# Patient Record
Sex: MALE | Race: OTHER | HISPANIC OR LATINO | URBAN - METROPOLITAN AREA
[De-identification: names, ages, dates, MRNs, and addresses within clinical notes are randomized per-mention and may not be internally consistent; named-entity substitution may affect disease eponyms.]

---

## 2020-08-17 ENCOUNTER — EMERGENCY (EMERGENCY)
Facility: HOSPITAL | Age: 25
LOS: 1 days | Discharge: HOME | End: 2020-08-17
Attending: STUDENT IN AN ORGANIZED HEALTH CARE EDUCATION/TRAINING PROGRAM
Payer: COMMERCIAL

## 2020-08-17 ENCOUNTER — EMERGENCY (EMERGENCY)
Facility: HOSPITAL | Age: 25
LOS: 1 days | End: 2020-08-17
Attending: EMERGENCY MEDICINE
Payer: COMMERCIAL

## 2020-08-17 VITALS
WEIGHT: 160.06 LBS | RESPIRATION RATE: 18 BRPM | HEART RATE: 95 BPM | DIASTOLIC BLOOD PRESSURE: 81 MMHG | SYSTOLIC BLOOD PRESSURE: 133 MMHG | OXYGEN SATURATION: 97 % | HEIGHT: 66 IN | TEMPERATURE: 98 F

## 2020-08-17 VITALS
HEART RATE: 90 BPM | DIASTOLIC BLOOD PRESSURE: 75 MMHG | RESPIRATION RATE: 17 BRPM | SYSTOLIC BLOOD PRESSURE: 122 MMHG | OXYGEN SATURATION: 98 %

## 2020-08-17 VITALS
HEART RATE: 64 BPM | SYSTOLIC BLOOD PRESSURE: 132 MMHG | OXYGEN SATURATION: 99 % | HEIGHT: 66 IN | TEMPERATURE: 98 F | DIASTOLIC BLOOD PRESSURE: 86 MMHG | RESPIRATION RATE: 20 BRPM

## 2020-08-17 LAB
ALBUMIN SERPL ELPH-MCNC: 4.1 G/DL — SIGNIFICANT CHANGE UP (ref 3.3–5.2)
ALP SERPL-CCNC: 70 U/L — SIGNIFICANT CHANGE UP (ref 40–120)
ALT FLD-CCNC: 33 U/L — SIGNIFICANT CHANGE UP
ANION GAP SERPL CALC-SCNC: 14 MMOL/L — SIGNIFICANT CHANGE UP (ref 5–17)
ANISOCYTOSIS BLD QL: SLIGHT — SIGNIFICANT CHANGE UP
AST SERPL-CCNC: 43 U/L — HIGH
BASOPHILS # BLD AUTO: 0.03 K/UL — SIGNIFICANT CHANGE UP (ref 0–0.2)
BASOPHILS NFR BLD AUTO: 1.7 % — SIGNIFICANT CHANGE UP (ref 0–2)
BILIRUB SERPL-MCNC: 0.5 MG/DL — SIGNIFICANT CHANGE UP (ref 0.4–2)
BUN SERPL-MCNC: 5 MG/DL — LOW (ref 8–20)
CALCIUM SERPL-MCNC: 8.1 MG/DL — LOW (ref 8.6–10.2)
CHLORIDE SERPL-SCNC: 100 MMOL/L — SIGNIFICANT CHANGE UP (ref 98–107)
CO2 SERPL-SCNC: 26 MMOL/L — SIGNIFICANT CHANGE UP (ref 22–29)
CREAT SERPL-MCNC: 0.62 MG/DL — SIGNIFICANT CHANGE UP (ref 0.5–1.3)
EOSINOPHIL # BLD AUTO: 0.02 K/UL — SIGNIFICANT CHANGE UP (ref 0–0.5)
EOSINOPHIL NFR BLD AUTO: 0.9 % — SIGNIFICANT CHANGE UP (ref 0–6)
GIANT PLATELETS BLD QL SMEAR: PRESENT — SIGNIFICANT CHANGE UP
GLUCOSE SERPL-MCNC: 112 MG/DL — HIGH (ref 70–99)
HCT VFR BLD CALC: 42.8 % — SIGNIFICANT CHANGE UP (ref 39–50)
HGB BLD-MCNC: 15.1 G/DL — SIGNIFICANT CHANGE UP (ref 13–17)
LIDOCAIN IGE QN: 34 U/L — SIGNIFICANT CHANGE UP (ref 22–51)
LYMPHOCYTES # BLD AUTO: 0.93 K/UL — LOW (ref 1–3.3)
LYMPHOCYTES # BLD AUTO: 47.8 % — HIGH (ref 13–44)
MANUAL SMEAR VERIFICATION: SIGNIFICANT CHANGE UP
MCHC RBC-ENTMCNC: 33.8 PG — SIGNIFICANT CHANGE UP (ref 27–34)
MCHC RBC-ENTMCNC: 35.3 GM/DL — SIGNIFICANT CHANGE UP (ref 32–36)
MCV RBC AUTO: 95.7 FL — SIGNIFICANT CHANGE UP (ref 80–100)
MICROCYTES BLD QL: SLIGHT — SIGNIFICANT CHANGE UP
MONOCYTES # BLD AUTO: 0.15 K/UL — SIGNIFICANT CHANGE UP (ref 0–0.9)
MONOCYTES NFR BLD AUTO: 7.8 % — SIGNIFICANT CHANGE UP (ref 2–14)
NEUTROPHILS # BLD AUTO: 0.7 K/UL — LOW (ref 1.8–7.4)
NEUTROPHILS NFR BLD AUTO: 35.7 % — LOW (ref 43–77)
OVALOCYTES BLD QL SMEAR: SLIGHT — SIGNIFICANT CHANGE UP
PLAT MORPH BLD: NORMAL — SIGNIFICANT CHANGE UP
PLATELET # BLD AUTO: 164 K/UL — SIGNIFICANT CHANGE UP (ref 150–400)
POLYCHROMASIA BLD QL SMEAR: SIGNIFICANT CHANGE UP
POTASSIUM SERPL-MCNC: 3.6 MMOL/L — SIGNIFICANT CHANGE UP (ref 3.5–5.3)
POTASSIUM SERPL-SCNC: 3.6 MMOL/L — SIGNIFICANT CHANGE UP (ref 3.5–5.3)
PROT SERPL-MCNC: 7 G/DL — SIGNIFICANT CHANGE UP (ref 6.6–8.7)
RBC # BLD: 4.47 M/UL — SIGNIFICANT CHANGE UP (ref 4.2–5.8)
RBC # FLD: 12.5 % — SIGNIFICANT CHANGE UP (ref 10.3–14.5)
RBC BLD AUTO: ABNORMAL
SODIUM SERPL-SCNC: 140 MMOL/L — SIGNIFICANT CHANGE UP (ref 135–145)
VARIANT LYMPHS # BLD: 6.1 % — HIGH (ref 0–6)
WBC # BLD: 1.95 K/UL — LOW (ref 3.8–10.5)
WBC # FLD AUTO: 1.95 K/UL — LOW (ref 3.8–10.5)

## 2020-08-17 PROCEDURE — 96375 TX/PRO/DX INJ NEW DRUG ADDON: CPT

## 2020-08-17 PROCEDURE — 80053 COMPREHEN METABOLIC PANEL: CPT

## 2020-08-17 PROCEDURE — 85027 COMPLETE CBC AUTOMATED: CPT

## 2020-08-17 PROCEDURE — 99284 EMERGENCY DEPT VISIT MOD MDM: CPT

## 2020-08-17 PROCEDURE — 99284 EMERGENCY DEPT VISIT MOD MDM: CPT | Mod: 25

## 2020-08-17 PROCEDURE — 36415 COLL VENOUS BLD VENIPUNCTURE: CPT

## 2020-08-17 PROCEDURE — 99283 EMERGENCY DEPT VISIT LOW MDM: CPT

## 2020-08-17 PROCEDURE — 96374 THER/PROPH/DIAG INJ IV PUSH: CPT

## 2020-08-17 PROCEDURE — 83690 ASSAY OF LIPASE: CPT

## 2020-08-17 RX ORDER — SUCRALFATE 1 G
1 TABLET ORAL
Qty: 120 | Refills: 0
Start: 2020-08-17 | End: 2020-09-15

## 2020-08-17 RX ORDER — ONDANSETRON 8 MG/1
1 TABLET, FILM COATED ORAL
Qty: 12 | Refills: 0
Start: 2020-08-17

## 2020-08-17 RX ORDER — FAMOTIDINE 10 MG/ML
20 INJECTION INTRAVENOUS ONCE
Refills: 0 | Status: COMPLETED | OUTPATIENT
Start: 2020-08-17 | End: 2020-08-17

## 2020-08-17 RX ORDER — SODIUM CHLORIDE 9 MG/ML
1000 INJECTION INTRAMUSCULAR; INTRAVENOUS; SUBCUTANEOUS ONCE
Refills: 0 | Status: COMPLETED | OUTPATIENT
Start: 2020-08-17 | End: 2020-08-17

## 2020-08-17 RX ORDER — ONDANSETRON 8 MG/1
4 TABLET, FILM COATED ORAL ONCE
Refills: 0 | Status: COMPLETED | OUTPATIENT
Start: 2020-08-17 | End: 2020-08-17

## 2020-08-17 RX ORDER — SUCRALFATE 1 G
1 TABLET ORAL ONCE
Refills: 0 | Status: COMPLETED | OUTPATIENT
Start: 2020-08-17 | End: 2020-08-17

## 2020-08-17 RX ADMIN — Medication 25 MILLIGRAM(S): at 13:43

## 2020-08-17 RX ADMIN — ONDANSETRON 4 MILLIGRAM(S): 8 TABLET, FILM COATED ORAL at 10:45

## 2020-08-17 RX ADMIN — FAMOTIDINE 20 MILLIGRAM(S): 10 INJECTION INTRAVENOUS at 13:43

## 2020-08-17 RX ADMIN — SODIUM CHLORIDE 1000 MILLILITER(S): 9 INJECTION INTRAMUSCULAR; INTRAVENOUS; SUBCUTANEOUS at 10:45

## 2020-08-17 RX ADMIN — FAMOTIDINE 20 MILLIGRAM(S): 10 INJECTION INTRAVENOUS at 10:46

## 2020-08-17 RX ADMIN — Medication 50 MILLIGRAM(S): at 10:46

## 2020-08-17 RX ADMIN — Medication 30 MILLILITER(S): at 10:46

## 2020-08-17 RX ADMIN — Medication 1 GRAM(S): at 13:43

## 2020-08-17 NOTE — ED STATDOCS - NSFOLLOWUPINSTRUCTIONS_ED_ALL_ED_FT
Gastritis - Adultos  Gastritis, Adult    La gastritis es la inflamación del estómago. Hay dos tipos de gastritis:    Gastritis aguda. Dipika tipo aparece de manera repentina.  Gastritis crónica. Dipika tipo dura mucho tiempo.    La gastritis se manifiesta cuando el revestimiento del estómago se debilita o se lesiona. Sin tratamiento, la gastritis puede causar sangrado y úlceras estomacales.    ¿Cuáles son las causas?  Esta afección puede ser causada por lo siguiente:    Fernanda infección.  Beber alcohol en exceso.  Ciertos medicamentos.  Hay demasiado ácido en el estómago.  Fernanda enfermedad del intestino o del estómago.  Estrés.    ¿Cuáles son los signos o síntomas?  Los síntomas de esta afección incluyen los siguientes:    Dolor o ardor en la parte superior del abdomen.  Náuseas.  Vómitos.  Sensación molesta de distensión después de comer.    En algunos casos no hay síntomas.    ¿Cómo se diagnostica?  Esta afección se puede diagnosticar mediante:    Fernanda descripción de coco síntomas.  Un examen físico.  Estudios. Estos pueden incluir los siguientes:  Análisis de jeffery.  Pruebas de materia fecal.  Un estudio en el que se introduce un instrumento mason y flexible con fernanda jihan y fernanda cámara en el extremo a través del esófago hasta el estómago (endoscopía ingrid).  Un estudio en el cual se elaine fernanda muestra de tejido para analizarlo (biopsia).    ¿Cómo se trata?  Esta afección se puede tratar con medicamentos. Si la afección se debe a fernanda infección bacteriana, pueden recetarle medicamentos antibióticos. Si se debe al exceso de ácido estomacal, se pueden administrar medicamentos denominados antagonistas H2, inhibidores de la bomba de protones o antiácidos. El tratamiento puede también incluir interrumpir el uso de ciertos medicamentos floridalma aspirina, ibuprofeno u otros antiinflamatorios no esteroideos (ALAINA).    Siga estas indicaciones en whitten casa:  [place] los medicamentos de venta irina y los recetados solamente floridalma se lo haya indicado el médico.  Si le recetaron un antibiótico, tómelo floridalma se lo haya indicado el médico. No deje de mian el antibiótico aunque comience a sentirse mejor.  Mona suficiente líquido para mantener la orina de color amarillo pálido.  Shae comidas pequeñas y frecuentes diana el día en lugar de comidas abundantes.  Evite los alimentos y las bebidas que intensifican los síntomas.     Comuníquese con un médico si:  Los síntomas empeoran.  Los síntomas regresan después del tratamiento.    Solicite ayuda de inmediato si:  Vomita jeffery de color govea brillante o fernanda sustancia similar a los granos de café.  La materia fecal es regis o de color govea oscuro.  No puede retener los líquidos.  El dolor abdominal empeora.  Tiene fiebre.  No mejora luego de 1 semana.    Resumen  La gastritis es la inflamación del estómago que se manifiesta cuando el revestimiento del estómago se debilita o se lesiona.  Esta afección se diagnostica mediante los antecedentes médicos, un examen físico o estudios.  Esta afección puede tratarse con medicamentos para tratar la infección o medicamentos para reducir la cantidad de ácido en el estómago.  Si la afección se debe al alcohol o a la irritación producida por medicamentos que está tomando, se le puede indicar que deje de mian alcohol o que deje de mian esos medicamentos.    Trastorno por consumo de bebidas alcohólicas  Alcohol Use Disorder    El trastorno por consumo de bebidas alcohólicas ocurre cuando el consumo de alcohol altera whitten chris cotidiana. Las personas que padecen esta afección beben demasiado alcohol y no pueden controlar el consumo.    Dipika trastorno puede causar problemas graves en la jose física. Puede afectar el cerebro, el corazón, el hígado, el páncreas, el sistema inmunitario, el estómago y los intestinos. El trastorno por consumo de bebidas alcohólicas puede aumentar whitten riesgo de contraer ciertos tipos de cáncer y causar problemas con la jose mental, tales floridalma depresión, ansiedad, psicosis, delirios y demencia. Las personas que tienen dipika trastorno corren el riesgo de lastimarse a ellas mismas o a otras personas.    ¿Cuáles son las causas?  Esta afección se debe al consumo excesivo de alcohol con el transcurso del tiempo. No es causado por consumir demasiado alcohol solo fernanda o dos veces. Algunas personas que sufren esta afección beben alcohol para enfrentarse a situaciones negativas de la chris o escapar de ellas. Otros beben para aliviar el dolor o los síntomas de fernanda enfermedad mental.    ¿Qué incrementa el riesgo?  Es más probable que desarrolle esta afección si:    Tiene antecedentes familiares de trastorno por consumo de bebidas alcohólicas.  Whitten cultura alienta el consumo de alcohol al punto de la intoxicación o facilita el acceso al alcohol.  Tuvo un trastorno emocional o de conducta en la infancia.  Fue víctima de abuso.  Es adolescente y:  Tiene calificaciones bajas o dificultades en la escuela.  Coco cuidadores no le hablan sobre negarse a mian alcohol ni supervisan coco actividades.  Es impulsivo o tiene problemas con el autocontrol.    ¿Cuáles son los signos o los síntomas?  Los síntomas de esta afección incluyen los siguientes:    Beber más de lo que desea.  Beber por más tiempo del que desea.  Intentar varias veces beber menos o controlar el consumo de alcohol.  Invertir mucho tiempo en conseguir alcohol, beber o recuperarse de lavell bebido.  Sentir fernanda necesidad imperiosa de beber alcohol.  Tener problemas en el trabajo, la escuela o el hogar debido al consumo de alcohol.  Tener problemas en las relaciones debido al consumo de alcohol.  Beber cuando es peligroso hacerlo, por ejemplo, antes de conducir.  Continuar bebiendo incluso sabiendo que podría tener un problema físico o mental relacionado con el consumo de alcohol.  Necesitar cada vez más alcohol para obtener el mismo efecto que desea de dipika (generar tolerancia).  Tener síntomas de abstinencia al dejar de beber. Entre los síntomas de abstinencia se incluyen los siguientes:  Fatiga.  Pesadillas.  Dificultad para dormir.  Depresión.  Ansiedad.  Fiebre.  Convulsiones.  Confusión grave.  Codie o sentir cosas que no existen (alucinaciones).  Temblores.  Frecuencia cardíaca acelerada.  Respiración rápida.  Hipertensión arterial.  Consumir para evitar los síntomas de abstinencia.    ¿Cómo se diagnostica?  Esta afección se diagnostica con fernanda evaluación. El médico puede comenzar la evaluación con dinora o cuatro preguntas sobre whitten consumo de alcohol.    El médico podrá realizar un examen físico o análisis de laboratorio para determinar si tiene problemas físicos floridalma resultado del consumo de alcohol. Puede derivarlo a un profesional de jose mental para que realice fernanda evaluación.    ¿Cómo se trata?  Algunas personas con trastorno por consumo de bebidas alcohólicas pueden reducir el consumo a niveles de bajo riesgo. Otras necesitan dejar el alcohol por completo. Cuando sea necesario, puede recibir ayuda de profesionales de jose mental capacitados en el tratamiento del abuso de sustancias. El médico puede ayudarlo a determinar la gravedad de whitten trastorno por consumo de bebidas alcohólicas y el tipo de tratamiento que necesita. Las formas de tratamiento disponibles son:    Desintoxicación. La desintoxicación implica dejar de beber y mian medicamentos recetados en el plazo de la primera semana para reducir los síntomas de la abstinencia. Dipika tratamiento es importante para las personas que ya fatima tenido síntomas de abstinencia y para los que consumen en exceso, quienes probablemente sufran síntomas de abstinencia. La abstinencia puede ser peligrosa y, en casos graves, puede causar la muerte. La desintoxicación puede realizarse en el hogar, en un ámbito extrahospitalario, en un centro de atención primaria, en un hospital o en un centro de tratamiento para abuso de sustancias.  Asesoramiento psicológico. Dipika tratamiento también se conoce floridalma psicoterapia. La realizan terapeutas especializados en el tratamiento de pacientes que abusan de sustancias. Un terapeuta puede abordar los motivos por los que consume alcohol y sugerirle maneras de evitar que vuelva a beber o que tenga un problema con la bebida. Los objetivos de la psicoterapia son los siguientes:  Encontrar actividades saludables y formas de afrontar el estrés.  Identificar y evitar aquello que lo conduzca a beber alcohol.  Aprender a controlar las ansias de beber.  Medicamentos. Los medicamentos pueden ayudar a tratar el trastorno por consumo de bebidas alcohólicas porque:  Controlan las ansias de beber.  Reducen el sentimiento positivo que experimenta al beber alcohol.  Causan fernanda reacción física desagradable cuando eduardo alcohol (terapia de aversión).  Grupos de apoyo. Los grupos de apoyo son dirigidos por personas que fatima superado whitten problema con la bebida. [place] apoyo emocional, consejos y orientación.    Estas formas de tratamiento, generalmente, se combinan. Algunas personas con esta afección se benefician del tratamiento combinado que se ofrece en algunos centros de tratamiento especializados en el abuso de sustancias.     Siga estas indicaciones en whitten casa:  [place] los medicamentos de venta irina y los recetados solamente floridalma se lo haya indicado el médico.  Consulte al médico antes de empezar cualquier medicamento nuevo.  Pida a familiares y amigos que no le ofrezcan alcohol.  Evite situaciones en las que se sirva alcohol, incluidas las reuniones en las que otros estén bebiendo alcohol.  Elabore un plan de acción si siente la tentación de beber alcohol.  Busque pasatiempos o actividades que disfrute, cherelle que no incluyan el consumo de alcohol.  Concurra a todas las visitas de control floridalma se lo haya indicado el médico. [place] es importante.    ¿Cómo se olegario?  Si eduardo, limite el consumo de alcohol a no más de 1 medida por día si es tigre y no está embarazada, y a 2 medidas si es hombre. Fernanda medida equivale a 12 oz (355 ml) de cerveza, 5 oz (148 ml) de vino o 1½ oz (44 ml) de bebidas alcohólicas de ingrid graduación.  Si tiene fernanda afección de jose mental, debe buscar tratamiento y apoyo.  No ofrezca alcohol a adolescentes.  Si es adolescente:  No mona alcohol.  No tenga miedo de negarse si alguien le ofrece alcohol. Diga en voz ingrid por qué no quiere beber alcohol. Puede ser un modelo a seguir positivo para coco amigos y un buen ejemplo para las personas que lo rodean al no consumir alcohol.  Si coco amigos beben alcohol, pase más tiempo con quienes no lo sohail. Shae nuevas amistades que no consuman alcohol.  Busque maneras saludables de controlar el estrés y las emociones, janette floridalma meditar, respirar profundo, hacer actividad física, pasar tiempo en la naturaleza, escuchar música o hablar con un amigo o un familiar confiable.    Comuníquese con un médico si:  No puede mian los medicamentos floridalma se lo fatima indicado.  Los síntomas empeoran.  Vuelve a consumir alcohol (recaída) y coco síntomas empeoran.    Solicite ayuda de inmediato si:  Tiene pensamientos acerca de lastimarse a usted mismo o a otras personas.    Si alguna vez siente que puede lastimarse a usted mismo o a los demás, o piensa en poner fin a whitten chris, busque ayuda de inmediato. Puede dirigirse al servicio de urgencias más cercano o comunicarse con:    Whitten servicio de emergencias local (911 en los [place]).  Fernanda línea de asistencia al suicida y atención en crisis, floridalma la Línea Nacional de Prevención del Suicidio (National Suicide Prevention Lifeline) al [phone]. Está disponible las 24 horas del día.    Resumen  El trastorno por consumo de bebidas alcohólicas ocurre cuando el consumo de alcohol altera whitten chris cotidiana. Las personas que padecen esta afección beben demasiado alcohol y no pueden controlar el consumo.  El tratamiento puede incluir desintoxicación, asesoramiento psicológico, medicamentos y grupos de apoyo.  Pida a familiares y amigos que no le ofrezcan alcohol. Evite situaciones en las que se sirva alcohol.  Busque ayuda de inmediato si tiene pensamientos acerca de lastimarse a usted mismo o a otras personas.    Pare de mian tanto alcol.  Por favor tenga seguimiento con whitten doctor primario entre 2 miguel.  Regrese a urgencias por cualquier preocupacion medica.

## 2020-08-17 NOTE — ED PROVIDER NOTE - OBJECTIVE STATEMENT
26 y/o male hx etoh abuse c/o drinking etoh daily for 2 weeks. States feels a little sick, with some abd discomfort and vomiting. Has had shakes in past. Denies f/c, hallucinations, cp, sob, diarrhea. Denies other drugs. Lives at home with mother.    ROS: No fever/chills. No eye pain/changes in vision, No ear pain/sore throat/dysphagia, No chest pain/palpitations. No SOB/cough/.  no black/bloody bm. No dysuria/frequency/discharge, No headache. No Dizziness.    No rashes or breaks in skin. No numbness/tingling/weakness.

## 2020-08-17 NOTE — ED PROVIDER NOTE - PHYSICAL EXAMINATION
Gen: No acute distress, non toxic. no tremor  HEENT: Mucous membranes moist, pink conjunctivae, EOMI. no tongue fasiculations  CV: RRR, nl s1/s2.  Resp: CTAB, normal rate and effort  GI: Abdomen soft, NT, ND. No rebound, no guarding. neg murphys  : No CVAT  Neuro: A&O x 3, moving all 4 extremities  MSK: No spine or joint tenderness to palpation  Skin: No rashes. intact and perfused.

## 2020-08-17 NOTE — ED PROVIDER NOTE - PATIENT PORTAL LINK FT
You can access the FollowMyHealth Patient Portal offered by [facility] by registering at the following website: [email or web address]. By joining [employer]’s FollowMyHealth portal, you will also be able to view your health information using other applications (apps) compatible with our system.

## 2020-08-17 NOTE — ED STATDOCS - NEUROLOGICAL, MLM
follows commands, motor indigo upper and lower ext 5/5, sensory symm and intact CN 2-12 grossly intact, no ataxia, no nystagmus, no dysmetria, no ddk, symm indigo, no pronator drift

## 2020-08-17 NOTE — ED PROVIDER NOTE - NSFOLLOWUPCLINICS_GEN_ALL_ED_FT
[id number] [place] [address]  Phone: [phone]  Fax:   Follow Up Time: 4-6 Days

## 2020-08-17 NOTE — ED ADULT TRIAGE NOTE - CHIEF COMPLAINT QUOTE
Pt admits to drinking daily x's two weeks, reports having four beers today and c/o abd pain 6/10, denies N/V, pt ambulatory, pt changed into yellow gown

## 2020-08-17 NOTE — ED PROVIDER NOTE - CLINICAL SUMMARY MEDICAL DECISION MAKING FREE TEXT BOX
likely etoh gastritis, no sign withdrawal currently though pt states drank earlier. abd benign. will check labs/lipase, symptom control, benzo to prevent withdrawal. reassess

## 2020-08-17 NOTE — ED ADULT NURSE NOTE - OBJECTIVE STATEMENT
Pt arrived c/o generalized abd pain. states "I drink too much that's why it hurts." also c/o nausea, denies vomiting diarrhea, fever chills, dizziness. pt is resting appears comfortable. alert and oriented x4. respirations even and unlabored. pending orders

## 2020-08-17 NOTE — ED STATDOCS - CLINICAL SUMMARY MEDICAL DECISION MAKING FREE TEXT BOX
24 y/o M with PMHx alcohol abuse presents to the ED c/o abdominal discomfort and vomiting. pt with slight withdrawal sx, siwa of 2. Pt was here earlier, received librium and was given librium prescription on pharmacy to continue. Pt notes acid reflux-like discomfort. Will add Carafate to regimen, offered detox but refusing at this time. Labs from 2 hours ago within normal benign exam. F/u with PMD. 26 y/o M with PMHx alcohol abuse presents to the ED c/o abdominal discomfort and vomiting. pt with slight withdrawal sx, ciwa of 1 (nausea). Pt was here earlier, received librium and was given librium prescription on pharmacy to continue. Pt notes acid reflux-like discomfort. Will add Carafate to regimen, offered detox but refusing at this time. Labs from 2 hours ago within normal benign exam. F/u with PMD.

## 2020-08-17 NOTE — ED ADULT TRIAGE NOTE - CHIEF COMPLAINT QUOTE
Pt who was discharged within the past hour returns with similar complaint of unresolved abdominal pain. Pt in no apparent distress.

## 2020-08-17 NOTE — ED PROVIDER NOTE - NSFOLLOWUPINSTRUCTIONS_ED_ALL_ED_FT
Abuso de alcohol    La intoxicación por alcohol ocurre cuando la cantidad de alcohol que fernanda persona ha consumido afecta horton capacidad para funcionar mental y físicamente. El consumo crónico de alcohol también puede provocar fernanda variedad de problemas de jose, floridalma enfermedades neurológicas, enfermedades del estómago, enfermedades del corazón, enfermedades del hígado, etc. No conduzca después de beber alcohol. Beber suficiente alcohol para terminar en fernanda tamica de emergencias sugiere que puede tener un problema de abuso de alcohol. Busque ayuda en un centro de adicción a las drogas.    BUSQUE ATENCIÓN MÉDICA INMEDIATA SI TIENE ALGUNO DE LOS SIGUIENTES SÍNTOMAS: convulsiones, vómitos con jeffery, jeffery en las heces, aturdimiento / mareo, o temblores o temblores cuando marques de beber.    Dolor abdominal    Muchas cosas pueden causar dolor abdominal. Muchas veces, el dolor abdominal no es causado por fernanda enfermedad y mejorará sin tratamiento. Horton proveedor de atención médica le hará un examen físico para determinar si existe fernanda causa peligrosa de horton dolor; Los análisis de jeffery y las imágenes pueden ayudar a determinar la causa de horton dolor. Sin embargo, en muchos casos, es posible que no se encuentre la causa y es posible que necesite más pruebas floridalma paciente ambulatorio. Controle horton dolor abdominal para detectar cualquier cambio.    BUSQUE ATENCIÓN MÉDICA INMEDIATA SI TIENE ALGUNO DE LOS SIGUIENTES SÍNTOMAS: empeoramiento del dolor abdominal, vómitos incontrolables, diarrea profusa, incapacidad para defecar o expulsar gases, heces negras o con jeffery, fiebre que acompaña al dolor de pecho o dolor de espalda, o desmayos. Estos síntomas pueden representar un problema grave que sea fernanda emergencia. No espere a isidoro si los síntomas desaparecen. Obtenga ayuda médica de inmediato. Llame al 911 y no conduzca al hospital.

## 2020-08-17 NOTE — ED PROVIDER NOTE - PROGRESS NOTE DETAILS
Pre and post operative expectations and routines explained to patient, verbalized understanding. Marta: pt feeling much better. tolerating po. no tremors. vs wnl. does not want detox. will send librium/zofran to pharmacy. ciwa 0 currently. informed of labs including leukopenia, instructed to f/u clinic. return precautions stable for d/c.

## 2020-08-17 NOTE — ED STATDOCS - OBJECTIVE STATEMENT
26 y/o M with PMHx alcohol abuse presents to the ED c/o abdominal discomfort and vomiting. Pt was at [facility] this morning for the same sx. Pt notes acid-reflux like discomfort. Pt admits to drinking EtOH daily for 2 weeks. Pt notes having the shakes in past. Pt denies fevers/chills, ha, loc, focal neuro deficits, cp/sob/palp, cough, diarrhea, urinary symptoms, recent travel and sick contacts. 26 y/o M with PMHx alcohol abuse presents to the ED c/o abdominal discomfort and nausea. Pt was at [facility] this morning for the same sx. Pt notes acid-reflux like discomfort. Pt admits to drinking EtOH daily for 2 weeks. Pt notes having the shakes in past. Pt denies fevers/chills, ha, loc, focal neuro deficits, cp/sob/palp, cough, diarrhea, urinary symptoms, recent travel and sick contacts.

## 2020-08-17 NOTE — ED STATDOCS - PATIENT PORTAL LINK FT
You can access the FollowMyHealth Patient Portal offered by [facility] by registering at the following website: [email or web address]. By joining [employer]’s FollowMyHealth portal, you will also be able to view your health information using other applications (apps) compatible with our system.
